# Patient Record
(demographics unavailable — no encounter records)

---

## 2024-09-30 NOTE — NUR
Patient up to Ambulate independently. Gait steady.  Discharge instructions
reviewed with patient. Patient verbalizes understanding.  Copy given to
patient to take home.MEDIPORT INFO/STARTED KIT SENT WITH PT. PT DISCHARGED TO
HOME, OUT VIA WHEELCHAIR WITH DISCHARGE INSTRUCTIONS AND BELONGINGS ON HAND.

## 2024-09-30 NOTE — NUR
REPORT RECEIVED FROM JESSICA CHAPPELL. VSS. PT ON RA. PT ABLE TO REPOSITION SELF IN
BED. PT REQUESTING PO FLUIDS AND TOLERATING THEM WELL. PT DENIES PAIN, NAUSEA
OR OTHER DISCOMFORTS. PT HAS GAUZE/TAPE DRESSING TO LEFT CHEST/NECK THAT IS
C/D/I.